# Patient Record
Sex: FEMALE | Race: WHITE | NOT HISPANIC OR LATINO | URBAN - METROPOLITAN AREA
[De-identification: names, ages, dates, MRNs, and addresses within clinical notes are randomized per-mention and may not be internally consistent; named-entity substitution may affect disease eponyms.]

---

## 2022-10-04 ENCOUNTER — EMERGENCY (EMERGENCY)
Facility: HOSPITAL | Age: 22
LOS: 0 days | Discharge: HOME | End: 2022-10-04
Attending: STUDENT IN AN ORGANIZED HEALTH CARE EDUCATION/TRAINING PROGRAM

## 2022-10-04 VITALS
HEART RATE: 88 BPM | TEMPERATURE: 99 F | DIASTOLIC BLOOD PRESSURE: 81 MMHG | RESPIRATION RATE: 18 BRPM | OXYGEN SATURATION: 99 % | SYSTOLIC BLOOD PRESSURE: 134 MMHG

## 2022-10-04 DIAGNOSIS — F17.290 NICOTINE DEPENDENCE, OTHER TOBACCO PRODUCT, UNCOMPLICATED: ICD-10-CM

## 2022-10-04 DIAGNOSIS — I49.3 VENTRICULAR PREMATURE DEPOLARIZATION: ICD-10-CM

## 2022-10-04 DIAGNOSIS — R00.2 PALPITATIONS: ICD-10-CM

## 2022-10-04 PROCEDURE — 93010 ELECTROCARDIOGRAM REPORT: CPT

## 2022-10-04 PROCEDURE — 99284 EMERGENCY DEPT VISIT MOD MDM: CPT

## 2022-10-04 NOTE — ED PROVIDER NOTE - CARE PLAN
1 Principal Discharge DX:	PVC (premature ventricular contraction)  Secondary Diagnosis:	Fluttering sensation of heart

## 2022-10-04 NOTE — ED PROVIDER NOTE - OBJECTIVE STATEMENT
22-year-old female with PMH, had lip filler injected at 4 PM, presents with feeling fluttering in her heart intermittently x 3 hrs. no palliating/provoking factors.  No chest pain, shortness of breath, back pain, abdominal pain, leg pain or swelling, recent travel, DVT or PE, fever, cough, congestion, nausea, vomiting, diarrhea, rash, pregnancy.   +vapes  no fhx early CAD.

## 2022-10-04 NOTE — ED ADULT NURSE NOTE - OBJECTIVE STATEMENT
Pt presented to ED states she has had palpitations x 1 day. denies chest pain. Denies sob fever chills n/v

## 2022-10-04 NOTE — ED PROVIDER NOTE - CLINICAL SUMMARY MEDICAL DECISION MAKING FREE TEXT BOX
22-year-old female presented with palpitations.  PVCs noted on EKG.  Patient instructed to follow-up with cardiology outpatient. Patient to be discharged from ED. Any available test results were discussed with patient and/or family. Verbal instructions given, including instructions to return to ED immediately for any new, worsening, or concerning symptoms. Patient endorsed understanding. Written discharge instructions additionally given, including follow-up plan.

## 2022-10-04 NOTE — ED PROVIDER NOTE - NS ED ROS FT
Review of Systems    Constitutional: (-) fever   Eyes/ENT: (-) vision changes  Cardiovascular: (-) chest pain, (-) syncope (+) palpitations  Respiratory: (-) cough, (-) shortness of breath  Gastrointestinal: (-) vomiting, (-) diarrhea  (-) abdominal pain  Musculoskeletal: (-) neck pain, (-) back pain, (-) leg pain/swelling  Integumentary: (-) rash, (-) edema  Neurological: (-) headache, (-) confusion  Hematologic: (-) easy bruising

## 2022-10-04 NOTE — ED PROVIDER NOTE - CARE PROVIDER_API CALL
Mateo Noland (MD)  Cardiovascular Disease; Interventional Cardiology  [address]  [place] [address]  Phone: [phone]  Fax: [phone]  Follow Up Time: 1-3 Days

## 2022-10-04 NOTE — ED PROVIDER NOTE - ATTENDING APP SHARED VISIT CONTRIBUTION OF CARE
22-year-old female presents emerged part for evaluation of palpitations that started today.  Of note patient had filler done to her lips at 4 PM.  Patient reports drinking caffeine daily.  Patient describes palpitations as fluttering, has never happened before.  Denies chest pain or shortness of breath.    No fever, nausea, vomiting, chest pain, sob, diaphoresis, cough, headache, dizziness, numbness/tingling, neck pain, abdominal pain, diarrhea, constipation, urinary symptoms, trauma, weakness, edema, calf pain or swelling, sick contacts, recent travel or rash.     Vital Signs: I have reviewed the initial vital signs.  Constitutional: Patient in no acute distress.  Integumentary: No rash.  ENT: MMM  NECK: Supple. no thyromegaly.   Cardiovascular: RRR, radial pulses 2/4 bilaterally.   Respiratory: Breath sounds CTA b/l, no wheezing or crackles, good air exchange, good resp effort and excursion, no accessory muscle use, no stridor.  Gastrointestinal: Abdomen soft, non-tender, non-distended, no rebound tenderness or guarding, no CVAT.   Musculoskeletal: FROM, No LE edema.   Neurologic: awake and alert, no FND, follows commands.

## 2022-10-04 NOTE — ED PROVIDER NOTE - PATIENT PORTAL LINK FT
You can access the FollowMyHealth Patient Portal offered by [facility] by registering at the following website: [email or web address]. By joining [facility]’s FollowMyHealth portal, you will also be able to view your health information using other applications (apps) compatible with our system.

## 2022-10-04 NOTE — ED PROVIDER NOTE - NS ED ATTENDING STATEMENT MOD
This was a shared visit with the ELSA. I reviewed and verified the documentation and independently performed the documented:

## 2025-05-13 PROBLEM — Z00.00 ENCOUNTER FOR PREVENTIVE HEALTH EXAMINATION: Status: ACTIVE | Noted: 2025-05-13

## 2025-07-17 ENCOUNTER — APPOINTMENT (OUTPATIENT)
Dept: OBGYN | Facility: CLINIC | Age: 25
End: 2025-07-17
Payer: COMMERCIAL

## 2025-07-17 VITALS — BODY MASS INDEX: 26.5 KG/M2 | HEIGHT: 60 IN | WEIGHT: 135 LBS

## 2025-07-17 PROBLEM — Z01.419 ENCOUNTER FOR ANNUAL ROUTINE GYNECOLOGICAL EXAMINATION: Status: ACTIVE | Noted: 2025-07-17

## 2025-07-17 LAB
APPEARANCE: CLEAR
BILIRUBIN URINE: NEGATIVE
BLOOD URINE: NEGATIVE
COLOR: YELLOW
GLUCOSE QUALITATIVE U: NEGATIVE
KETONES URINE: NEGATIVE
LEUKOCYTE ESTERASE URINE: ABNORMAL
NITRITE URINE: NEGATIVE
PH URINE: 8
PROTEIN URINE: NEGATIVE
SPECIFIC GRAVITY URINE: 1.01
UROBILINOGEN URINE: 0.2 (ref 0.2–?)

## 2025-07-17 PROCEDURE — 99385 PREV VISIT NEW AGE 18-39: CPT

## 2025-07-21 DIAGNOSIS — N76.0 ACUTE VAGINITIS: ICD-10-CM

## 2025-07-21 LAB
A VAGINAE DNA VAG QL NAA+PROBE: ABNORMAL
BVAB2 DNA VAG QL NAA+PROBE: NORMAL
C KRUSEI DNA VAG QL NAA+PROBE: NEGATIVE
C TRACH RRNA SPEC QL NAA+PROBE: NEGATIVE
CANDIDA DNA VAG QL NAA+PROBE: NEGATIVE
MEGA1 DNA VAG QL NAA+PROBE: ABNORMAL
N GONORRHOEA RRNA SPEC QL NAA+PROBE: NEGATIVE
T VAGINALIS RRNA SPEC QL NAA+PROBE: NEGATIVE

## 2025-07-21 RX ORDER — METRONIDAZOLE 7.5 MG/G
0.75 GEL VAGINAL
Qty: 1 | Refills: 1 | Status: ACTIVE | COMMUNITY
Start: 2025-07-21 | End: 1900-01-01

## 2025-07-31 ENCOUNTER — RESULT REVIEW (OUTPATIENT)
Age: 25
End: 2025-07-31

## 2025-07-31 ENCOUNTER — OUTPATIENT (OUTPATIENT)
Dept: OUTPATIENT SERVICES | Facility: HOSPITAL | Age: 25
LOS: 1 days | End: 2025-07-31
Payer: COMMERCIAL

## 2025-07-31 DIAGNOSIS — Z00.8 ENCOUNTER FOR OTHER GENERAL EXAMINATION: ICD-10-CM

## 2025-07-31 DIAGNOSIS — N63.20 UNSPECIFIED LUMP IN THE LEFT BREAST, UNSPECIFIED QUADRANT: ICD-10-CM

## 2025-07-31 PROCEDURE — 76641 ULTRASOUND BREAST COMPLETE: CPT | Mod: 26,LT

## 2025-07-31 PROCEDURE — 76641 ULTRASOUND BREAST COMPLETE: CPT | Mod: LT

## 2025-08-01 DIAGNOSIS — N63.20 UNSPECIFIED LUMP IN THE LEFT BREAST, UNSPECIFIED QUADRANT: ICD-10-CM
